# Patient Record
Sex: FEMALE | Race: WHITE | NOT HISPANIC OR LATINO | URBAN - METROPOLITAN AREA
[De-identification: names, ages, dates, MRNs, and addresses within clinical notes are randomized per-mention and may not be internally consistent; named-entity substitution may affect disease eponyms.]

---

## 2017-02-20 ENCOUNTER — TRANSFERRED RECORDS (OUTPATIENT)
Dept: HEALTH INFORMATION MANAGEMENT | Facility: CLINIC | Age: 72
End: 2017-02-20

## 2017-02-21 ENCOUNTER — TRANSFERRED RECORDS (OUTPATIENT)
Dept: HEALTH INFORMATION MANAGEMENT | Facility: CLINIC | Age: 72
End: 2017-02-21

## 2017-02-22 DIAGNOSIS — H53.10 SUBJECTIVE VISUAL DISTURBANCE: Primary | ICD-10-CM

## 2017-03-01 ENCOUNTER — OFFICE VISIT (OUTPATIENT)
Dept: OPHTHALMOLOGY | Facility: CLINIC | Age: 72
End: 2017-03-01

## 2017-03-01 DIAGNOSIS — H47.019 AION (ACUTE ISCHEMIC OPTIC NEUROPATHY), UNSPECIFIED LATERALITY: Primary | ICD-10-CM

## 2017-03-01 DIAGNOSIS — H53.10 SUBJECTIVE VISUAL DISTURBANCE: ICD-10-CM

## 2017-03-01 DIAGNOSIS — H47.20 OPTIC ATROPHY: ICD-10-CM

## 2017-03-01 ASSESSMENT — CONF VISUAL FIELD
OS_NORMAL: 1
METHOD: COUNTING FINGERS
OD_NORMAL: 1

## 2017-03-01 ASSESSMENT — REFRACTION_MANIFEST
OD_AXIS: 080
OS_AXIS: 080
OS_CYLINDER: +2.00
OD_CYLINDER: +1.75
OD_SPHERE: -2.00
OS_SPHERE: -2.00
OS_ADD: +2.50
OD_ADD: +2.50

## 2017-03-01 ASSESSMENT — VISUAL ACUITY
METHOD: SNELLEN - LINEAR
OS_CC+: -2
OD_CC+: -2
OS_CC: 20/60ECC
OD_CC: 20/20

## 2017-03-01 ASSESSMENT — CUP TO DISC RATIO
OD_RATIO: 0.1
OS_RATIO: 0.1

## 2017-03-01 ASSESSMENT — REFRACTION_WEARINGRX
OD_SPHERE: -2.75
OS_SPHERE: -2.00
OS_CYLINDER: +2.00
OS_AXIS: 082
OD_CYLINDER: +1.75
OS_ADD: +2.50
OD_AXIS: 083
OD_ADD: +2.50

## 2017-03-01 ASSESSMENT — TONOMETRY
IOP_METHOD: ICARE
OS_IOP_MMHG: 11
OD_IOP_MMHG: 15

## 2017-03-01 ASSESSMENT — SLIT LAMP EXAM - LIDS
COMMENTS: NORMAL
COMMENTS: NORMAL

## 2017-03-01 ASSESSMENT — EXTERNAL EXAM - RIGHT EYE: OD_EXAM: NORMAL

## 2017-03-01 NOTE — PROGRESS NOTES
Assessment & Plan     Lacho Gilbert is a 71 year old female with the following diagnoses:   1. AION (acute ischemic optic neuropathy), unspecified laterality    2. Subjective visual disturbance    3. Optic atrophy       She has optic atrophy LEFT eye.  She noted cloudy vision sometime this past year.  She thinks it got worse for a while but has remained stable since Cataract extraction in January.  Her visual acuity is 20/60 LEFT eye and color vision is down.  She has an obvious afferent pupillary defect.  I reviewed her MRI images myself.  The MRI was a good one and shows no inflammation or compression.  She has a small cup to disc ratio both eyes and she has sectoral pallor LEFT eye.  This is most consistent with Anterior ischemic optic neuropathy (AION).  However, we don't know that she had optic disc edema or acute vision loss at any point.  Will ask her to return to clinic in 6 months sooner as needed for worsening symptoms.  If she remains stable, then this is most consistent with Anterior ischemic optic neuropathy (AION).  Literature given.           Attending Physician Attestation:  I have seen and examined this patient.  I have confirmed and edited as necessary the chief complaint(s), history of present illness, review of systems, relevant history, and examination findings as documented by others.  I have personally reviewed the relevant tests, images, and reports as documented above.  I have confirmed and edited as necessary the assessment and plan and agree with this note.  - Brigido Mcdowell MD 9:42 AM 3/1/2017

## 2017-03-01 NOTE — NURSING NOTE
Chief Complaints and History of Present Illnesses   Patient presents with     Consult For     shadow in left eye after cataract surgery     HPI    Affected eye(s):  Both   Symptoms:     Blurred vision   Distorted vision      Frequency:  Constant       Do you have eye pain now?:  No      Comments:  Pt states had cataract surgery on the left eye 1/16/2017 and has had a shadow or haze in the left eye since. Pt has not had surgery in the right eye. No pain.     Henri HERNANDEZ 7:46 AM March 1, 2017

## 2017-03-01 NOTE — LETTER
3/1/2017         RE:  :  MRN: Lacho Gilbert  1945  [id number]     Dear Dr. Chung,    Thank you for asking me to see your very pleasant patient, Lacho Gilbert, in neuro-ophthalmic consultation.  I would like to thank you for sending your records and I have summarized them in the history of present illness. She presented with her spouse who provided additional history.  My assessment and plan are below.  For further details, please see my attached clinic note.        Assessment & Plan     Lacho Gilbert is a 71 year old female with the following diagnoses:   1. AION (acute ischemic optic neuropathy), unspecified laterality    2. Subjective visual disturbance    3. Optic atrophy       She has optic atrophy LEFT eye.  She noted cloudy vision sometime this past year.  She thinks it got worse for a while but has remained stable since Cataract extraction in January.  Her visual acuity is 20/60 LEFT eye and color vision is down.  She has an obvious afferent pupillary defect.  I reviewed her MRI images myself.  The MRI was a good one and shows no inflammation or compression.  She has a small cup to disc ratio both eyes and she has sectoral pallor LEFT eye.  This is most consistent with Anterior ischemic optic neuropathy (AION).  However, we don't know that she had optic disc edema or acute vision loss at any point.  Will ask her to return to clinic in 6 months sooner as needed for worsening symptoms.  If she remains stable, then this is most consistent with Anterior ischemic optic neuropathy (AION).  Literature given.        Again, thank you for allowing me to participate in the care of your patient.      Sincerely,    Brigido Mcdowell MD  Professor, Neuro-Ophthalmology  Department of Ophthalmology and Visual Neurosciences  [facility]    CC: Baljeet Chung  [facility]  [address] [address]  VIA Facsimile: [phone]     Susy Veliz MD  [facility]  [address] [facility] [address]  [facility] [address]  VIA Facsimile: [phone]       DX = optic atrophy

## 2017-03-01 NOTE — MR AVS SNAPSHOT
After Visit Summary   3/1/2017    Lacho Gilbert    MRN: [id number]           Patient Information     Date Of Birth          1945        Visit Information        Provider Department      3/1/2017 7:30 AM Brigido Mcdowell MD  [facility]        Today's Diagnoses     AION (acute ischemic optic neuropathy), unspecified laterality    -  1    Subjective visual disturbance        Optic atrophy          Care Instructions                                  Follow-ups after your visit        Follow-up notes from your care team     Return in about 6 months (around 9/1/2017) for Vision, color, tension, dilate, RNFL, GTOP.      Your next 10 appointments already scheduled     Sep 13, 2017  8:30 AM CDT   (Arrive by 8:15 AM)   NEW NEURO with Brigido Mcdowell MD   [facility] ([facility] [place])    [address] [place] [address] [phone]              Who to contact     Please call your clinic at [phone] to:    Ask questions about your health    Make or cancel appointments    Discuss your medicines    Learn about your test results    Speak to your doctor   If you have compliments or concerns about an experience at your clinic, or if you wish to file a complaint, please contact [facility] Patient Relations at [phone] or email us at [email or web address]         Additional Information About Your Visit        Chat Sportshart Information     TextRecruit gives you secure access to your electronic health record. If you see a primary care provider, you can also send messages to your care team and make appointments. If you have questions, please call your primary care clinic.  If you do not have a primary care provider, please call [phone] and they will assist you.      TextRecruit is an electronic gateway that provides easy, online access to your medical records. With TextRecruit, you can request a clinic  appointment, read your test results, renew a prescription or communicate with your care team.     To access your existing account, please contact your [facility] or call [phone] for assistance.        Care EveryWhere ID     This is your Care EveryWhere ID. This could be used by other organizations to access your [place] medical records  [id number]         Blood Pressure from Last 3 Encounters:   04/17/13 115/73   11/26/12 126/80   10/24/12 101/70    Weight from Last 3 Encounters:   04/17/13 67.6 kg (149 lb)   11/26/12 61.3 kg (135 lb 4 oz)   10/24/12 58.1 kg (128 lb)              We Performed the Following     Color Vision - Screening OU (both eyes)     Glaucoma Top OU     IOP Measurement     OCT Optic Nerve RNFL Spectralis OU (both eyes)        Primary Care Provider Office Phone # Fax #    Susy Jasmin Veliz [phone]       [facility] [address]  [facility] [address]        Thank you!     Thank you for choosing [facility] OPHTHALMOLOGY  for your care. Our goal is always to provide you with excellent care. Hearing back from our patients is one way we can continue to improve our services. Please take a few minutes to complete the written survey that you may receive in the mail after your visit with us. Thank you!             Your Updated Medication List - Protect others around you: Learn how to safely use, store and throw away your medicines at www.disposemymeds.org.          This list is accurate as of: 3/1/17 10:08 AM.  Always use your most recent med list.                   Brand Name Dispense Instructions for use    acetaminophen 500 MG tablet    TYLENOL     Take 2 tablets by mouth every 6 hours as needed.       aspirin 81 MG tablet      Take 1 tablet by mouth daily.       atorvastatin 40 MG tablet    LIPITOR    90 tablet    Take 1 tablet by mouth daily.       blood glucose lancing device     100 each    1 Device 2 times daily.        blood glucose monitoring test strip    no brand specified    150 strip    by In Vitro route 4 times daily. by In Vitro route 4 times daily. Needs to establish care with new provider for ongoing refills       fenofibrate 160 MG tablet     90 tablet    Take 1 tablet by mouth daily.       gabapentin 100 MG capsule    NEURONTIN    270 capsule    Take 1 capsule by mouth 3 times daily.       GLUCOSE CONTROL Liqd     1    Accuchek       lisinopril-hydrochlorothiazide 10-12.5 MG per tablet    PRINZIDE/ZESTORETIC    90 tablet    Take 1 tablet by mouth daily.       metFORMIN 1000 MG tablet    GLUCOPHAGE    180 tablet    Take 1 tablet by mouth 2 times daily (with meals).       sertraline 50 MG tablet    ZOLOFT    90 tablet    Take 1 tablet by mouth daily.       traMADol 50 MG tablet    ULTRAM    60 tablet    Take 1 tablet by mouth every 6 hours as needed for pain.       triamcinolone 0.1 % cream    KENALOG    45 g    Apply  topically 2 times daily.       TUMS 500 MG chewable tablet   Generic drug:  calcium carbonate      AS NEEDED       VITAMIN B 12 PO      Take  by mouth. 1000mg daily

## 2017-03-02 ENCOUNTER — TELEPHONE (OUTPATIENT)
Dept: OPHTHALMOLOGY | Facility: CLINIC | Age: 72
End: 2017-03-02

## 2017-03-02 NOTE — TELEPHONE ENCOUNTER
Last glasses Rx mailed to pt  website and number for mychart rep provided-- pt may print from there in future-- pt signed up, but unsure how to log in  Vishal Castro RN 3:47 PM 03/02/17

## 2017-09-09 DIAGNOSIS — H53.10 SUBJECTIVE VISUAL DISTURBANCE: Primary | ICD-10-CM

## 2017-09-13 ENCOUNTER — OFFICE VISIT (OUTPATIENT)
Dept: OPHTHALMOLOGY | Facility: CLINIC | Age: 72
End: 2017-09-13

## 2017-09-13 DIAGNOSIS — H20.9 TRAUMATIC IRITIS: ICD-10-CM

## 2017-09-13 DIAGNOSIS — H47.012 AION (ANTERIOR ISCHEMIC OPTIC NEUROPATHY), LEFT EYE: Primary | ICD-10-CM

## 2017-09-13 DIAGNOSIS — H53.10 SUBJECTIVE VISUAL DISTURBANCE: ICD-10-CM

## 2017-09-13 RX ORDER — PREDNISOLONE ACETATE 10 MG/ML
1 SUSPENSION/ DROPS OPHTHALMIC 4 TIMES DAILY
Qty: 1 BOTTLE | Refills: 0 | Status: SHIPPED | OUTPATIENT
Start: 2017-09-13 | End: 2017-10-04

## 2017-09-13 ASSESSMENT — REFRACTION_WEARINGRX
OS_CYLINDER: +2.00
OD_ADD: +2.50
OS_AXIS: 082
OD_AXIS: 083
OD_SPHERE: -2.75
OS_ADD: +2.50
OS_SPHERE: -2.00
OD_CYLINDER: +1.75

## 2017-09-13 ASSESSMENT — VISUAL ACUITY
OS_PH_CC: 20/60-2
OD_CC: 20/20
OS_CC+: ECC
METHOD: SNELLEN - LINEAR
CORRECTION_TYPE: GLASSES
OS_CC: 20/70

## 2017-09-13 ASSESSMENT — TONOMETRY
IOP_METHOD: ICARE
OS_IOP_MMHG: 13
OD_IOP_MMHG: 12

## 2017-09-13 ASSESSMENT — SLIT LAMP EXAM - LIDS: COMMENTS: NORMAL

## 2017-09-13 ASSESSMENT — CUP TO DISC RATIO
OD_RATIO: 0.1
OS_RATIO: 0.1

## 2017-09-13 ASSESSMENT — EXTERNAL EXAM - RIGHT EYE: OD_EXAM: NORMAL

## 2017-09-13 ASSESSMENT — CONF VISUAL FIELD
OS_NORMAL: 1
OD_NORMAL: 1

## 2017-09-13 NOTE — PROGRESS NOTES
Assessment & Plan     Lacho Gilbert is a 72 year old female with the following diagnoses:   1. AION (anterior ischemic optic neuropathy), left eye - Left Eye    2. Subjective visual disturbance - Left Eye    3. Traumatic iritis       She is here for follow up for presumed AION in the left eye seen in 3/2017 without witnessed swelling. She had normal MRI at the time. She has noted improvement or worsening in her vision in the left eye. She has poked herself in the left eye on Sunday. She has had YAG capsulotomy to the left eye one month ago.' Her vision, color vision. OCT RNFL and Visual field testing are stable since the last visit indicating no progression and consistent with the diagnosis of Anterior ischemic optic neuropathy (AION).    She has traumatic iritis in the setting of recent trauma. Would give prednisolone acetate 1% ophthalmic suspension three times a day reducing by one drop daily each week.  Follow up 3 weeks sooner as needed for worsening symptoms.         Attending Physician Attestation:  Complete documentation of historical and exam elements from today's encounter can be found in the full encounter summary report (not reduplicated in this progress note).  I personally obtained the chief complaint(s) and history of present illness.  I confirmed and edited as necessary the review of systems, past medical/surgical history, family history, social history, and examination findings as documented by others; and I examined the patient myself.  I personally reviewed the relevant tests, images, and reports as documented above.  I formulated and edited as necessary the assessment and plan and discussed the findings and management plan with the patient and family. - Brigido Mcdowell MD

## 2017-09-13 NOTE — NURSING NOTE
Chief Complaints and History of Present Illnesses   Patient presents with     Follow Up For     6 month f/u AION      HPI    Affected eye(s):  Both   Symptoms:     No blurred vision (Comment: no changes per pt)   No double vision   Redness   Foreign body sensation   Tearing   No itching   Photophobia   No eye discharge         Do you have eye pain now?:  Yes   Location:  OS   Pain Level:  Severe Pain (7)   Pain Duration:  3 days   Pain Frequency:  Intermittent   Pain Characteristics:  Aching      Comments:  Patient notes she poked herself in the LE on Sunday, intermittent pain and redness, photophobia, and epiphora, denies discharge. Patient denies SEBASTIÁN Juarez September 13, 2017 8:19 AM

## 2017-09-13 NOTE — MR AVS SNAPSHOT
After Visit Summary   9/13/2017    Lacho Gilbert    MRN: [id number]           Patient Information     Date Of Birth          1945        Visit Information        Provider Department      9/13/2017 8:30 AM Brigido Mcdowell MD [facility]        Today's Diagnoses     AION (anterior ischemic optic neuropathy), left eye - Left Eye    -  1    Subjective visual disturbance - Left Eye        Traumatic iritis           Follow-ups after your visit        Your next 10 appointments already scheduled     Oct 11, 2017  1:00 PM CDT   (Arrive by 12:45 PM)   RETURN NEURO with Brigido Mcdowell MD   [facility] ([facility] [place])    [address]  [address] [facility] [address] [phone]              Who to contact     Please call your clinic at [phone] to:    Ask questions about your health    Make or cancel appointments    Discuss your medicines    Learn about your test results    Speak to your doctor   If you have compliments or concerns about an experience at your clinic, or if you wish to file a complaint, please contact [facility] Patient Relations at [phone] or email us at [email or web address]         Additional Information About Your Visit        MyChart Information     [employer] gives you secure access to your electronic health record. If you see a primary care provider, you can also send messages to your care team and make appointments. If you have questions, please call your primary care clinic.  If you do not have a primary care provider, please call [phone] and they will assist you.      Grabbit is an electronic gateway that provides easy, online access to your medical records. With Grabbit, you can request a clinic appointment, read your test results, renew a prescription or communicate with your care team.     To access your existing account, please contact your [facility]  [place] [facility] or call [phone] for assistance.        Care EveryWhere ID     This is your Care EveryWhere ID. This could be used by other organizations to access your [place] medical records  [id number]         Blood Pressure from Last 3 Encounters:   04/17/13 115/73   11/26/12 126/80   10/24/12 101/70    Weight from Last 3 Encounters:   04/17/13 67.6 kg (149 lb)   11/26/12 61.3 kg (135 lb 4 oz)   10/24/12 58.1 kg (128 lb)              We Performed the Following     Color Vision - Screening OU (both eyes)     DILATED FUNDUS EXAM     Glaucoma Top OU     IOP Measurement     OCT Optic Nerve RNFL Spectralis OU (both eyes)          Today's Medication Changes          These changes are accurate as of: 9/13/17  9:39 AM.  If you have any questions, ask your nurse or doctor.               Start taking these medicines.        Dose/Directions    prednisoLONE acetate 1 % ophthalmic susp   Commonly known as:  PRED FORTE   Used for:  Traumatic iritis   Started by:  Brigido Mcdowell MD        Dose:  1 drop   Place 1 drop Into the left eye 4 times daily for 21 days   Quantity:  1 Bottle   Refills:  0            Where to get your medicines      These medications were sent to [id number] - [place] - [address]  [address], [place] [address]    Hours:  Same info as Jacques Villatoro Phone:  [phone]     prednisoLONE acetate 1 % ophthalmic susp                Primary Care Provider Office Phone # Fax #    Susy Jasmin Veliz [phone]       [facility] [address]  [facility] [address]        Equal Access to Services     [facility]: Hadii aad ku hadasho Soomaali, waaxda luqadaha, qaybta kaalmada ademarcie, wayne dillon . So [facility] [phone].    ATENCIÓN: Si habla español, tiene a angulo disposición servicios gratuitos de asistencia lingüística. Llame al [phone].    We comply with applicable [facility] civil  rights laws and [place] laws. We do not discriminate on the basis of race, color, national origin, age, disability sex, sexual orientation or gender identity.            Thank you!     Thank you for choosing [facility] OPHTHALMOLOGY  for your care. Our goal is always to provide you with excellent care. Hearing back from our patients is one way we can continue to improve our services. Please take a few minutes to complete the written survey that you may receive in the mail after your visit with us. Thank you!             Your Updated Medication List - Protect others around you: Learn how to safely use, store and throw away your medicines at www.disposemymeds.org.          This list is accurate as of: 9/13/17  9:39 AM.  Always use your most recent med list.                   Brand Name Dispense Instructions for use Diagnosis    acetaminophen 500 MG tablet    TYLENOL     Take 2 tablets by mouth every 6 hours as needed.        aspirin 81 MG tablet      Take 1 tablet by mouth daily.        atorvastatin 40 MG tablet    LIPITOR    90 tablet    Take 1 tablet by mouth daily.    Hypercholesteremia       blood glucose lancing device     100 each    1 Device 2 times daily.    Type II or unspecified type diabetes mellitus without mention of complication, not stated as uncontrolled       blood glucose monitoring test strip    no brand specified    150 strip    by In Vitro route 4 times daily. by In Vitro route 4 times daily. Needs to establish care with new provider for ongoing refills    Type II or unspecified type diabetes mellitus without mention of complication, not stated as uncontrolled       fenofibrate 160 MG tablet     90 tablet    Take 1 tablet by mouth daily.    Hypertriglyceridemia       gabapentin 100 MG capsule    NEURONTIN    270 capsule    Take 1 capsule by mouth 3 times daily.    Degeneration of cervical intervertebral disc, Arthrodesis status       GLUCOSE CONTROL Liqd     1    Accuchek    Other abnormal glucose        lisinopril-hydrochlorothiazide 10-12.5 MG per tablet    PRINZIDE/ZESTORETIC    90 tablet    Take 1 tablet by mouth daily.    Hypertension, benign       metFORMIN 1000 MG tablet    GLUCOPHAGE    180 tablet    Take 1 tablet by mouth 2 times daily (with meals).    Type II or unspecified type diabetes mellitus without mention of complication, not stated as uncontrolled       prednisoLONE acetate 1 % ophthalmic susp    PRED FORTE    1 Bottle    Place 1 drop Into the left eye 4 times daily for 21 days    Traumatic iritis       sertraline 50 MG tablet    ZOLOFT    90 tablet    Take 1 tablet by mouth daily.    Anxiety       traMADol 50 MG tablet    ULTRAM    60 tablet    Take 1 tablet by mouth every 6 hours as needed for pain.    Lumbar stenosis       triamcinolone 0.1 % cream    KENALOG    45 g    Apply  topically 2 times daily.    Seborrhea       TUMS 500 MG chewable tablet   Generic drug:  calcium carbonate      AS NEEDED        VITAMIN B 12 PO      Take  by mouth. 1000mg daily

## 2017-10-11 ENCOUNTER — OFFICE VISIT (OUTPATIENT)
Dept: OPHTHALMOLOGY | Facility: CLINIC | Age: 72
End: 2017-10-11

## 2017-10-11 DIAGNOSIS — H47.012 AION (ANTERIOR ISCHEMIC OPTIC NEUROPATHY), LEFT EYE: Primary | ICD-10-CM

## 2017-10-11 DIAGNOSIS — H20.9 TRAUMATIC IRITIS: ICD-10-CM

## 2017-10-11 ASSESSMENT — CONF VISUAL FIELD
OD_NORMAL: 1
OS_NORMAL: 1
METHOD: COUNTING FINGERS

## 2017-10-11 ASSESSMENT — REFRACTION_WEARINGRX
OD_SPHERE: -2.75
OD_CYLINDER: +1.75
OS_AXIS: 082
OD_ADD: +2.50
OS_CYLINDER: +2.00
OD_AXIS: 083
OS_ADD: +2.50
OS_SPHERE: -2.00

## 2017-10-11 ASSESSMENT — TONOMETRY
OS_IOP_MMHG: 16
OD_IOP_MMHG: 17
IOP_METHOD: ICARE

## 2017-10-11 ASSESSMENT — SLIT LAMP EXAM - LIDS
COMMENTS: NORMAL
COMMENTS: NORMAL

## 2017-10-11 ASSESSMENT — VISUAL ACUITY
OD_CC: 20/20
OS_CC: 20/100
OS_CC+: -1
METHOD: SNELLEN - LINEAR
CORRECTION_TYPE: GLASSES
OD_CC+: -2

## 2017-10-11 ASSESSMENT — EXTERNAL EXAM - RIGHT EYE: OD_EXAM: NORMAL

## 2017-10-11 ASSESSMENT — EXTERNAL EXAM - LEFT EYE: OS_EXAM: NORMAL

## 2017-10-11 NOTE — NURSING NOTE
Chief Complaints and History of Present Illnesses   Patient presents with     Follow Up For     anterior ischemic optic neuropathy     HPI    Affected eye(s):  Left   Symptoms:     No blurred vision   Itching      Frequency:  Constant       Do you have eye pain now?:  No      Comments:  Pt states here for follow up exam. No changes/ no improvements to vision. Still using prednisolone acetate 1% ophthalmic QD OS. No pain.    Henri HERNANDEZ 12:51 PM October 11, 2017

## 2017-10-11 NOTE — PROGRESS NOTES
Assessment & Plan     Lacho Gilbert is a 72 year old female with the following diagnoses:   1. AION (anterior ischemic optic neuropathy), left eye - Left Eye    2. Traumatic iritis       Follow up Anterior ischemic optic neuropathy (AION) LEFT eye and traumatic iritis LEFT eye.  Overall, inflammation is significantly better. Would stay off prednisolone acetate 1% ophthalmic suspension for now.  Follow up 1 year sooner as needed for worsening symptoms.           Attending Physician Attestation:  Complete documentation of historical and exam elements from today's encounter can be found in the full encounter summary report (not reduplicated in this progress note).  I personally obtained the chief complaint(s) and history of present illness.  I confirmed and edited as necessary the review of systems, past medical/surgical history, family history, social history, and examination findings as documented by others; and I examined the patient myself.  I personally reviewed the relevant tests, images, and reports as documented above.  I formulated and edited as necessary the assessment and plan and discussed the findings and management plan with the patient and family. - Brigido Mcdowell MD

## 2017-10-11 NOTE — MR AVS SNAPSHOT
After Visit Summary   10/11/2017    Lacho Gilbert    MRN: [id number]           Patient Information     Date Of Birth          1945        Visit Information        Provider Department      10/11/2017 1:00 PM Brigido Mcdowell MD [facility] Ophthalmology        Today's Diagnoses     AION (anterior ischemic optic neuropathy), left eye - Left Eye    -  1    Traumatic iritis           Follow-ups after your visit        Who to contact     Please call your clinic at [phone] to:    Ask questions about your health    Make or cancel appointments    Discuss your medicines    Learn about your test results    Speak to your doctor   If you have compliments or concerns about an experience at your clinic, or if you wish to file a complaint, please contact [facility] Patient Relations at [phone] or email us at [email or web address]         Additional Information About Your Visit        MyChart Information     [employer] gives you secure access to your electronic health record. If you see a primary care provider, you can also send messages to your care team and make appointments. If you have questions, please call your primary care clinic.  If you do not have a primary care provider, please call [phone] and they will assist you.      [employer] is an electronic gateway that provides easy, online access to your medical records. With [employer], you can request a clinic appointment, read your test results, renew a prescription or communicate with your care team.     To access your existing account, please contact your [facility] or call [phone] for assistance.        Care EveryWhere ID     This is your Care EveryWhere ID. This could be used by other organizations to access your [facility] medical records  [id number]         Blood Pressure from Last 3 Encounters:   04/17/13 115/73   11/26/12 126/80   10/24/12 101/70    Weight from Last 3  Encounters:   04/17/13 67.6 kg (149 lb)   11/26/12 61.3 kg (135 lb 4 oz)   10/24/12 58.1 kg (128 lb)              Today, you had the following     No orders found for display       Primary Care Provider Office Phone # Fax #    Susy Jasmin Veliz [phone]       [facility] [address]  [facility] [address]        Equal Access to Services     [facility]: Hadii aad ku hadasho Soomaali, waaxda luqadaha, qaybta kaalmada adeegyada, waxay idiin hayaan adeeg kharash la'horacen . So [facility] [phone].    ATENCIÓN: Si shirley bragg, tiene a angulo disposición servicios gratuitos de asistencia lingüística. [facility] [phone].    We comply with applicable federal civil rights laws and [place] laws. We do not discriminate on the basis of race, color, national origin, age, disability, sex, sexual orientation, or gender identity.            Thank you!     Thank you for choosing [facility] OPHTHALMOLOGY  for your care. Our goal is always to provide you with excellent care. Hearing back from our patients is one way we can continue to improve our services. Please take a few minutes to complete the written survey that you may receive in the mail after your visit with us. Thank you!             Your Updated Medication List - Protect others around you: Learn how to safely use, store and throw away your medicines at www.disposemymeds.org.          This list is accurate as of: 10/11/17  1:03 PM.  Always use your most recent med list.                   Brand Name Dispense Instructions for use Diagnosis    acetaminophen 500 MG tablet    TYLENOL     Take 2 tablets by mouth every 6 hours as needed.        aspirin 81 MG tablet      Take 1 tablet by mouth daily.        atorvastatin 40 MG tablet    LIPITOR    90 tablet    Take 1 tablet by mouth daily.    Hypercholesteremia       blood glucose lancing device     100 each    1 Device 2 times daily.    Type II or unspecified type diabetes mellitus without  mention of complication, not stated as uncontrolled       blood glucose monitoring test strip    no brand specified    150 strip    by In Vitro route 4 times daily. by In Vitro route 4 times daily. Needs to establish care with new provider for ongoing refills    Type II or unspecified type diabetes mellitus without mention of complication, not stated as uncontrolled       fenofibrate 160 MG tablet     90 tablet    Take 1 tablet by mouth daily.    Hypertriglyceridemia       gabapentin 100 MG capsule    NEURONTIN    270 capsule    Take 1 capsule by mouth 3 times daily.    Degeneration of cervical intervertebral disc, Arthrodesis status       GLUCOSE CONTROL Liqd     1    Accuchek    Other abnormal glucose       lisinopril-hydrochlorothiazide 10-12.5 MG per tablet    PRINZIDE/ZESTORETIC    90 tablet    Take 1 tablet by mouth daily.    Hypertension, benign       metFORMIN 1000 MG tablet    GLUCOPHAGE    180 tablet    Take 1 tablet by mouth 2 times daily (with meals).    Type II or unspecified type diabetes mellitus without mention of complication, not stated as uncontrolled       sertraline 50 MG tablet    ZOLOFT    90 tablet    Take 1 tablet by mouth daily.    Anxiety       traMADol 50 MG tablet    ULTRAM    60 tablet    Take 1 tablet by mouth every 6 hours as needed for pain.    Lumbar stenosis       triamcinolone 0.1 % cream    KENALOG    45 g    Apply  topically 2 times daily.    Seborrhea       TUMS 500 MG chewable tablet   Generic drug:  calcium carbonate      AS NEEDED        VITAMIN B 12 PO      Take  by mouth. 1000mg daily

## 2019-09-28 ENCOUNTER — HEALTH MAINTENANCE LETTER (OUTPATIENT)
Age: 74
End: 2019-09-28

## 2019-10-27 ENCOUNTER — HEALTH MAINTENANCE LETTER (OUTPATIENT)
Age: 74
End: 2019-10-27

## 2020-03-15 ENCOUNTER — HEALTH MAINTENANCE LETTER (OUTPATIENT)
Age: 75
End: 2020-03-15

## 2021-01-10 ENCOUNTER — HEALTH MAINTENANCE LETTER (OUTPATIENT)
Age: 76
End: 2021-01-10

## 2021-05-08 ENCOUNTER — HEALTH MAINTENANCE LETTER (OUTPATIENT)
Age: 76
End: 2021-05-08

## 2021-08-28 ENCOUNTER — HEALTH MAINTENANCE LETTER (OUTPATIENT)
Age: 76
End: 2021-08-28

## 2021-10-23 ENCOUNTER — HEALTH MAINTENANCE LETTER (OUTPATIENT)
Age: 76
End: 2021-10-23

## 2022-04-09 ENCOUNTER — HEALTH MAINTENANCE LETTER (OUTPATIENT)
Age: 77
End: 2022-04-09

## 2022-06-04 ENCOUNTER — HEALTH MAINTENANCE LETTER (OUTPATIENT)
Age: 77
End: 2022-06-04

## 2022-10-09 ENCOUNTER — HEALTH MAINTENANCE LETTER (OUTPATIENT)
Age: 77
End: 2022-10-09

## 2022-11-26 ENCOUNTER — HEALTH MAINTENANCE LETTER (OUTPATIENT)
Age: 77
End: 2022-11-26

## 2023-05-27 ENCOUNTER — HEALTH MAINTENANCE LETTER (OUTPATIENT)
Age: 78
End: 2023-05-27

## 2023-06-10 ENCOUNTER — HEALTH MAINTENANCE LETTER (OUTPATIENT)
Age: 78
End: 2023-06-10

## 2024-01-06 ENCOUNTER — HEALTH MAINTENANCE LETTER (OUTPATIENT)
Age: 79
End: 2024-01-06